# Patient Record
Sex: MALE | Race: WHITE | NOT HISPANIC OR LATINO | Employment: OTHER | URBAN - METROPOLITAN AREA
[De-identification: names, ages, dates, MRNs, and addresses within clinical notes are randomized per-mention and may not be internally consistent; named-entity substitution may affect disease eponyms.]

---

## 2023-09-26 ENCOUNTER — HOSPITAL ENCOUNTER (EMERGENCY)
Facility: HOSPITAL | Age: 78
Discharge: HOME | End: 2023-09-26
Attending: EMERGENCY MEDICINE | Admitting: EMERGENCY MEDICINE
Payer: COMMERCIAL

## 2023-09-26 VITALS
HEART RATE: 92 BPM | HEIGHT: 67 IN | BODY MASS INDEX: 41.28 KG/M2 | SYSTOLIC BLOOD PRESSURE: 165 MMHG | DIASTOLIC BLOOD PRESSURE: 85 MMHG | OXYGEN SATURATION: 94 % | TEMPERATURE: 97.7 F | RESPIRATION RATE: 16 BRPM | WEIGHT: 263 LBS

## 2023-09-26 DIAGNOSIS — J02.9 PHARYNGITIS, UNSPECIFIED ETIOLOGY: ICD-10-CM

## 2023-09-26 DIAGNOSIS — F43.21 GRIEF REACTION: ICD-10-CM

## 2023-09-26 PROBLEM — D50.9 IRON DEFICIENCY ANEMIA: Status: ACTIVE | Noted: 2023-02-09

## 2023-09-26 PROBLEM — A69.20 LYME DISEASE: Status: ACTIVE | Noted: 2023-09-26

## 2023-09-26 PROBLEM — C44.90 MALIGNANT NEOPLASM OF SKIN: Status: ACTIVE | Noted: 2017-05-12

## 2023-09-26 PROBLEM — R33.9 URINE RETENTION: Status: ACTIVE | Noted: 2022-07-20

## 2023-09-26 PROBLEM — R42 DIZZINESS ON STANDING: Status: ACTIVE | Noted: 2022-02-10

## 2023-09-26 PROBLEM — C43.9 MELANOMA OF SKIN (H): Status: ACTIVE | Noted: 2023-04-17

## 2023-09-26 PROBLEM — H90.3 SENSORINEURAL HEARING LOSS, ASYMMETRICAL: Status: ACTIVE | Noted: 2022-02-10

## 2023-09-26 PROBLEM — H93.13 TINNITUS, BILATERAL: Status: ACTIVE | Noted: 2022-02-10

## 2023-09-26 PROBLEM — D22.9 ATYPICAL NEVUS: Status: ACTIVE | Noted: 2019-10-21

## 2023-09-26 LAB
FLUAV RNA SPEC QL NAA+PROBE: NEGATIVE
FLUBV RNA RESP QL NAA+PROBE: NEGATIVE
GROUP A STREP BY PCR: NOT DETECTED
RSV RNA SPEC NAA+PROBE: NEGATIVE
SARS-COV-2 RNA RESP QL NAA+PROBE: NEGATIVE

## 2023-09-26 PROCEDURE — 99283 EMERGENCY DEPT VISIT LOW MDM: CPT

## 2023-09-26 PROCEDURE — 87637 SARSCOV2&INF A&B&RSV AMP PRB: CPT | Performed by: STUDENT IN AN ORGANIZED HEALTH CARE EDUCATION/TRAINING PROGRAM

## 2023-09-26 PROCEDURE — 87651 STREP A DNA AMP PROBE: CPT | Performed by: STUDENT IN AN ORGANIZED HEALTH CARE EDUCATION/TRAINING PROGRAM

## 2023-09-26 RX ORDER — ALBUTEROL SULFATE 90 UG/1
2 AEROSOL, METERED RESPIRATORY (INHALATION)
COMMUNITY
Start: 2023-06-23

## 2023-09-26 RX ORDER — FLUTICASONE PROPIONATE 110 UG/1
1 AEROSOL, METERED RESPIRATORY (INHALATION)
COMMUNITY
Start: 2023-06-23

## 2023-09-26 RX ORDER — LISINOPRIL 20 MG/1
1 TABLET ORAL
COMMUNITY
Start: 2023-06-13

## 2023-09-26 RX ORDER — SILDENAFIL CITRATE 20 MG/1
TABLET ORAL
COMMUNITY
Start: 2022-11-28

## 2023-09-26 RX ORDER — CELECOXIB 200 MG/1
CAPSULE ORAL
COMMUNITY
Start: 2023-03-10

## 2023-09-26 NOTE — ED PROVIDER NOTES
"EMERGENCY DEPARTMENT ENCOUNTER      NAME: Saravanan Momin  AGE: 78 year old male  YOB: 1945  MRN: [id number]  EVALUATION DATE & TIME: No admission date for patient encounter.    PCP: No Ref-Primary, Physician    ED PROVIDER: Mt Fonseca M.D.      Chief Complaint   Patient presents with    Pharyngitis         IMPRESSION  1. Pharyngitis, unspecified etiology    2. Grief reaction        PLAN  - close PCP follow up  - discharge to home    ED COURSE & MEDICAL DECISION MAKING    ED Course as of 09/26/23 1753   Tue Sep 26, 2023   1542 I met with the patient, obtained history, performed an initial exam, and discussed options and plan for diagnostics and treatment here in the ED.   1555 78yoM with history of asthma, HTN, hip replacement (walks with a cane) presenting for evaluation of sore throat and difficulty swallowing. Reports he awoke this morning feeling fine, then started think about his wife & recent divorce (10 months ago) and became sad; then had sore throat with inability to swallow. Mild ongoing sore throat since then but able to swallow since then throughout the day today. No runny nose, nasal congestion, fevers, sweats, chills, any other complaints. Took no meds prior to arrival. States \"I have not processed my divorce yet\" and is sad about it; thinks this is likely playing a role in his symptoms today. States he had not cried since his divorce until today. States he has a therapist he can see. Denies any suicidal ideation, homicidal ideation, hallucinations.    SBP 160s on presentation. Exam with tonsils 1+ with no erythema/edema/exudate, no anterior neck tenderness, clear lungs, normal work of breathing, benign abdomen, walks unassisted with cane.    COVID/influenza/RSV swab & strep swabs negative. No concern for deep space neck infection per exam such as RPA, PTA, Lemierre's, meningitis; imaging or blood testing not indicated. Patient tolerating PO without difficulty here now; doubt " esophageal obstruction or perforation. Doubt stroke with resulting dysphagia. Suspect grief reaction leading to sore throat with brief swallowing difficulty earlier today; doubt emergent life-threatening etiology. Ok for outpatient management. No concern for SI or need for DEC assessment here in the ED; already has outpatient mental health resources he is plugged in with. Patient able to tolerate PO and walk without difficulty. Patient comfortable with discharge at this time. Return precautions and need for PCP follow up discussed and understood. No further questions at the time of discharge.       --------------------------------------------------------------------------------   --------------------------------------------------------------------------------     3:42 PM I met with the patient for the initial interview and physical examination. Discussed plan for treatment and workup in the ED.      This patient involved a high degree of complexity in medical decision making, as significant risks were present and assessed. Recent encounters & results in medical record reviewed by me.    All workup (i.e. any EKG/labs/imaging as per charting below) reviewed and independently interpreted by me. See respective sections for details.    Broad differential considered for this patient, including but not limited to:  infectious pharyngitis, deep space neck infection, RPA, PTA, Lemierre's, grief reaction, globus sensation, anxiety, stroke, esophageal obstruction      See additional MDM below if interested.      MEDICATIONS GIVEN IN THE EMERGENCY DEPARTMENT  Medications - No data to display    NEW PRESCRIPTIONS STARTED AT TODAY'S ER VISIT  Discharge Medication List as of 9/26/2023  4:15 PM        CONTINUE these medications which have NOT CHANGED    Details   albuterol (PROAIR HFA/PROVENTIL HFA/VENTOLIN HFA) 108 (90 Base) MCG/ACT inhaler Inhale 2 puffs into the lungs, Historical      celecoxib (CELEBREX) 200 MG capsule  Historical      fluticasone (FLOVENT HFA) 110 MCG/ACT inhaler Inhale 1 puff into the lungs, Historical      lisinopril (ZESTRIL) 20 MG tablet Take 1 tablet by mouth, Historical      sildenafil (REVATIO) 20 MG tablet TAKE 3 TABLETS BY MOUTH PRIOR TO SEXUAL INTERCOURSE ONCE DAILY AS NEEDED., Historical      Ubiquinol 100 MG CAPS Take 1 capsule by mouth every morning, Historical                 =================================================================      HPI  Use of : N/A        Saravanan Momin is a 78 year old male with a pertinent history of asthma, hypertension, melanoma, urine retention, and dizziness on standing who presents to this ED by wheelchair for evaluation of pharyngitis.    Patient reports that he had a hard time swallowing his food this morning and couldn't keep it down. This has never happened before. He is swallowing his saliva normally. His wife left him 10 months ago and took his daughter with. He says he hasn't really processed his emotions and cried for the first time this morning. He lives alone. Patient has urinary incontinence. He denies any chest pain, congestion, or suicidal thoughts.        --------------- MEDICAL HISTORY ---------------  PAST MEDICAL HISTORY:  Reviewed independently by me.  No past medical history on file.  Patient Active Problem List   Diagnosis    Asthma    Atypical nevus    Calculus of gallbladder    Dizziness on standing    Erectile dysfunction    Hypertension    Iron deficiency anemia    Lyme disease    Melanoma of skin (H)    Malignant neoplasm of skin    Patellofemoral arthritis of left knee    Periumbilical hernia    Primary osteoarthritis of left hip    Sensorineural hearing loss, asymmetrical    Tinnitus, bilateral    Urine retention       PAST SURGICAL HISTORY:  Reviewed independently by me.  No past surgical history on file.    CURRENT MEDICATIONS:    Reviewed independently by me.  No current facility-administered medications for this  "encounter.    Current Outpatient Medications:     albuterol (PROAIR HFA/PROVENTIL HFA/VENTOLIN HFA) 108 (90 Base) MCG/ACT inhaler, Inhale 2 puffs into the lungs, Disp: , Rfl:     celecoxib (CELEBREX) 200 MG capsule, , Disp: , Rfl:     fluticasone (FLOVENT HFA) 110 MCG/ACT inhaler, Inhale 1 puff into the lungs, Disp: , Rfl:     lisinopril (ZESTRIL) 20 MG tablet, Take 1 tablet by mouth, Disp: , Rfl:     sildenafil (REVATIO) 20 MG tablet, TAKE 3 TABLETS BY MOUTH PRIOR TO SEXUAL INTERCOURSE ONCE DAILY AS NEEDED., Disp: , Rfl:     Ubiquinol 100 MG CAPS, Take 1 capsule by mouth every morning, Disp: , Rfl:     ALLERGIES:  Reviewed independently by me.  No Known Allergies    FAMILY HISTORY:  Reviewed independently by me.  Reviewed. No pertinent past family history.    SOCIAL HISTORY:   Reviewed independently by me.  Social History     Socioeconomic History    Marital status:        --------------- PHYSICAL EXAM ---------------  Nursing notes and vitals independently reviewed by me.  VITALS:  Vitals:    09/26/23 1336   BP: (!) 165/85   Pulse: 92   Resp: 16   Temp: 97.7  F (36.5  C)   TempSrc: Oral   SpO2: 94%   Weight: 119.3 kg (263 lb)   Height: 1.702 m (5' 7\")       PHYSICAL EXAM:    General:  alert, interactive, no distress. Walks with cane.  Eyes:  conjunctivae clear, conjugate gaze  HENT:  atraumatic, nose with no rhinorrhea, oropharynx clear. Tonsils 1+ =, no erythema, no edema, no exudate, handing secretions without difficulty, able to swallow.  Neck:  no meningismus  Cardiovascular:  HR 80s during exam, regular rhythm, no murmurs, brisk cap refill  Chest:  no chest wall tenderness  Pulmonary:  no stridor, normal phonation, normal work of breathing, clear lungs bilaterally  Abdomen:  soft, nondistended, nontender  :  no CVA tenderness  Back:  no midline spinal tenderness  Musculoskeletal:  no pretibial edema, no calf tenderness. Gross ROM intact to joints of extremities with no obvious deformities. 1+ " non-tender edema to knees bilaterally.  Neuro:  awake, alert, answers questions appropriately, follows commands, moves all limbs  Psych:  calm, normal affect      --------------- RESULTS ---------------  LAB:  Reviewed and independently interpreted by me.  Results for orders placed or performed during the hospital encounter of 09/26/23   Symptomatic Influenza A/B, RSV, & SARS-CoV2 PCR (COVID-19) Nasopharyngeal    Specimen: Nasopharyngeal; Swab   Result Value Ref Range    Influenza A PCR Negative Negative    Influenza B PCR Negative Negative    RSV PCR Negative Negative    SARS CoV2 PCR Negative Negative   Group A Streptococcus PCR Throat Swab    Specimen: Throat; Swab   Result Value Ref Range    Group A strep by PCR Not Detected Not Detected               --------------- ADDITIONAL MDM ---------------  History:  - I considered systemic symptoms of the presenting illness.  - Supplemental history from:       -- patient  - External Record(s) reviewed:       -- see above charting, if applicable       -- Inpatient/outpatient record (clinic visit 9/7/23), prior labs (blood 7/5/23), prior imaging (CXR 3/29/23)    Workup:  - Chart documentation above includes differential considered and any EKGs or imaging independently interpreted by provider.  - In additional to work up documented, I considered the following work up:       -- see above charting, if applicable    External Consultation:  - Discussion of management with another provider:       -- see above charting, if applicable    Complicating Factors:  - Care impacted by chronic illness:       -- see above MDM, past medical history, & problem list  - Care affected by social determinants of health:       -- see above social history       -- Access to Affordable Healthcare    Disposition Considerations:  - Discharge       -- I considered escalation of care with admission to the hospital, but ultimately discharged the patient per decision making above       -- I recommended  the patient continue their current prescription strength medication(s) as charted above in current medications list       -- I considered prescription pain medication, comfortable without this       -- I recommended over-the-counter medication(s) as charted above & in discharge instructions         I, Madelyn Montana, am serving as a scribe to document services personally performed by Dr. Mt Fonseca based on my observation and the provider's statements to me. I, Mt Fonseca MD attest that Madelyn Montana is acting in a scribe capacity, has observed my performance of the services and has documented them in accordance with my direction.      Mt Fonseca MD  09/26/23  Emergency Medicine  [facility] EMERGENCY DEPARTMENT  [address] [place] [id number] [phone]  Dept: [phone]       Mt Fonseca MD  09/26/23 9006

## 2023-09-26 NOTE — ED TRIAGE NOTES
Pt presents to the ED with c/o difficulty swallowing that started this morning.  Pt not drooling, nor spitting out saliva.  Pt reports sore throat.     Triage Assessment       Row Name 09/26/23 4152       Triage Assessment (Adult)    Airway WDL WDL       Respiratory WDL    Respiratory WDL WDL

## 2023-09-26 NOTE — DISCHARGE INSTRUCTIONS
Continue all of your previously-prescribed medications.    As needed for pain control at home, take:  - over-the-counter ibuprofen 800mg by mouth every 8 hours (max dose 2400mg in 24 hours)  - over-the-counter acetaminophen 1g by mouth every 6 hours (max dose 4g in 24 hours)    Follow up with your Primary Care provider in 2 days for a recheck.    Return to the Emergency Department for any inability to swallow, difficulty breathing, or any other concerns.

## 2024-09-07 ENCOUNTER — NURSE TRIAGE (OUTPATIENT)
Dept: NURSING | Facility: CLINIC | Age: 79
End: 2024-09-07
Payer: COMMERCIAL

## 2024-09-08 NOTE — TELEPHONE ENCOUNTER
"Nurse Triage SBAR    Is this a 2nd Level Triage? NO    Situation: Pt with high blood pressure     Background: Pt calls stating he has blood pressure of 165/110. Reports to be on lisinopril but is out. According to Pt went to pharmacy today and was told lisinopril was not covered by insurance.     Assessment: Full triage unable to be performed. Pt agitated and repeating that is pre-teen family member will \"have to deal with his corpse\" if he is not given lisinopril. Via chart review, see Pts primary is through [facility]. As well as all of his care outside of an ED visit. Unable to verify medications.     Protocol Recommended Disposition:   Go to ED Now    Recommendation: Informed pt he should be seen in ED if he is having any cardiac, respiratory or neuro symptoms. Pt claims to have them all and \"no one can figure out what's wrong\" Pt states \"I'm not going to waste my time going to the ER\". Suggested calling [facility] for assistance, or to go to the ED to be seen. PT hung up without committing to any care, said he'd attempt to call [facility]      Does the patient meet one of the following criteria for ADS visit consideration? No      Reason for Disposition   [1] Systolic BP  >= 160 OR Diastolic >= 100 AND [2] cardiac (e.g., breathing difficulty, chest pain) or neurologic symptoms (e.g., new-onset blurred or double vision, unsteady gait)    Protocols used: Blood Pressure - High-A-AH    "

## 2024-09-29 ENCOUNTER — HEALTH MAINTENANCE LETTER (OUTPATIENT)
Age: 79
End: 2024-09-29